# Patient Record
Sex: MALE | Race: BLACK OR AFRICAN AMERICAN | NOT HISPANIC OR LATINO
[De-identification: names, ages, dates, MRNs, and addresses within clinical notes are randomized per-mention and may not be internally consistent; named-entity substitution may affect disease eponyms.]

---

## 2021-09-09 PROBLEM — Z00.00 ENCOUNTER FOR PREVENTIVE HEALTH EXAMINATION: Status: ACTIVE | Noted: 2021-09-09

## 2021-09-17 ENCOUNTER — APPOINTMENT (OUTPATIENT)
Dept: DERMATOLOGY | Facility: CLINIC | Age: 31
End: 2021-09-17

## 2021-09-24 ENCOUNTER — APPOINTMENT (OUTPATIENT)
Dept: DERMATOLOGY | Facility: CLINIC | Age: 31
End: 2021-09-24
Payer: MEDICAID

## 2021-09-24 PROCEDURE — 99204 OFFICE O/P NEW MOD 45 MIN: CPT

## 2021-09-24 NOTE — PHYSICAL EXAM
[FreeTextEntry3] : cystic nodule gluteal, axillae, under pannus\par \par red patches/plaques face, beard area

## 2021-09-24 NOTE — HISTORY OF PRESENT ILLNESS
[FreeTextEntry1] : cysts, psoriasis [de-identified] : cysts pilonidal cyst\par draining\par \par ?psor

## 2021-09-24 NOTE — ASSESSMENT
[FreeTextEntry1] : HS\par rx doxy 1 mo\par \par Psor\par rx protopic (face)\par \par Pilonidal cyst - refer to surg

## 2021-10-26 ENCOUNTER — NON-APPOINTMENT (OUTPATIENT)
Age: 31
End: 2021-10-26

## 2021-10-29 ENCOUNTER — NON-APPOINTMENT (OUTPATIENT)
Age: 31
End: 2021-10-29

## 2021-11-03 ENCOUNTER — APPOINTMENT (OUTPATIENT)
Dept: UROLOGY | Facility: CLINIC | Age: 31
End: 2021-11-03
Payer: MEDICAID

## 2021-11-03 VITALS — DIASTOLIC BLOOD PRESSURE: 87 MMHG | TEMPERATURE: 94.7 F | HEART RATE: 111 BPM | SYSTOLIC BLOOD PRESSURE: 164 MMHG

## 2021-11-03 DIAGNOSIS — Z86.69 PERSONAL HISTORY OF OTHER DISEASES OF THE NERVOUS SYSTEM AND SENSE ORGANS: ICD-10-CM

## 2021-11-03 DIAGNOSIS — I10 ESSENTIAL (PRIMARY) HYPERTENSION: ICD-10-CM

## 2021-11-03 DIAGNOSIS — Z87.2 PERSONAL HISTORY OF DISEASES OF THE SKIN AND SUBCUTANEOUS TISSUE: ICD-10-CM

## 2021-11-03 PROCEDURE — 99203 OFFICE O/P NEW LOW 30 MIN: CPT

## 2021-11-03 RX ORDER — CIPROFLOXACIN HYDROCHLORIDE 500 MG/1
500 TABLET, FILM COATED ORAL
Qty: 14 | Refills: 0 | Status: DISCONTINUED | COMMUNITY
Start: 2021-11-03 | End: 2021-11-03

## 2021-11-04 ENCOUNTER — APPOINTMENT (OUTPATIENT)
Dept: DERMATOLOGY | Facility: CLINIC | Age: 31
End: 2021-11-04
Payer: MEDICAID

## 2021-11-04 DIAGNOSIS — L72.11 PILAR CYST: ICD-10-CM

## 2021-11-04 PROCEDURE — 99214 OFFICE O/P EST MOD 30 MIN: CPT

## 2021-11-04 RX ORDER — TACROLIMUS 1 MG/G
0.1 OINTMENT TOPICAL TWICE DAILY
Qty: 1 | Refills: 1 | Status: DISCONTINUED | COMMUNITY
Start: 2021-09-24 | End: 2021-11-04

## 2021-11-04 NOTE — ASSESSMENT
[FreeTextEntry1] : GIORGI DAVIS  is a 31 year old male morbidly obese who presents for chronic fatigue, progressive weakness, right sided testicular pain, possible testicular lump and painless hematuria. He has pain with palpation of right side testicle. Exam is normal\par \par PLAN\par -UA & UCx & urine cytology\par -US Testicles\par -US Kidney/Bladder \par -Follow up in 3 weeks \par -Already taking doxycycline from derm. No new abx at this time. Follow up UA/UCx

## 2021-11-04 NOTE — PHYSICAL EXAM
[General Appearance - Well Developed] : well developed [General Appearance - Well Nourished] : well nourished [Normal Appearance] : normal appearance [Well Groomed] : well groomed [General Appearance - In No Acute Distress] : no acute distress [Edema] : no peripheral edema [] : no respiratory distress [Respiration, Rhythm And Depth] : normal respiratory rhythm and effort [Exaggerated Use Of Accessory Muscles For Inspiration] : no accessory muscle use [Normal Station and Gait] : the gait and station were normal for the patient's age [FreeTextEntry1] : pain with palpation, normal testes and epididymis bilaterally

## 2021-11-04 NOTE — END OF VISIT
[FreeTextEntry3] : 30yo morbidly obese male with chronic fatigue for over a year, c/o new right testicular pain and hematuria. Suspect possible UTI, epididymitis. Recommend UA, culture, cytology. He is taking doxycycline. Check scrotal US and renal bladder US. F/u 3 weeks

## 2021-11-04 NOTE — HISTORY OF PRESENT ILLNESS
[5] : 5 [FreeTextEntry1] : GIORGI DAVIS  is a 31 year old male who presents for fatigue, progressive weakness, right sided testicular pain and painless hematuria.  \par \par He reports dull right sided intermittent testicular pain that first started 6 months ago and became severe on 10/26 after sitting down.  He reports his testicle is swollen and there is a lump that "appeared out of nowhere". 2 days later he reports painless hematuria x3 that resolved spontaneously.  Reports significant fatigue with any activity or walking over the past year. Reports an 80 lb weight loss and regained within the past year.

## 2021-11-08 ENCOUNTER — NON-APPOINTMENT (OUTPATIENT)
Age: 31
End: 2021-11-08

## 2021-11-08 LAB
APPEARANCE: ABNORMAL
BACTERIA UR CULT: NORMAL
BACTERIA: NEGATIVE
BILIRUBIN URINE: NEGATIVE
BLOOD URINE: NEGATIVE
COLOR: YELLOW
GLUCOSE QUALITATIVE U: NEGATIVE
HYALINE CASTS: 2 /LPF
KETONES URINE: NEGATIVE
LEUKOCYTE ESTERASE URINE: NEGATIVE
MICROSCOPIC-UA: NORMAL
NITRITE URINE: NEGATIVE
PH URINE: 5.5
PROTEIN URINE: NORMAL
RED BLOOD CELLS URINE: 1 /HPF
SPECIFIC GRAVITY URINE: 1.02
SQUAMOUS EPITHELIAL CELLS: 2 /HPF
URINE CYTOLOGY: NORMAL
UROBILINOGEN URINE: NORMAL
WHITE BLOOD CELLS URINE: 2 /HPF

## 2021-11-15 ENCOUNTER — OUTPATIENT (OUTPATIENT)
Dept: OUTPATIENT SERVICES | Facility: HOSPITAL | Age: 31
LOS: 1 days | End: 2021-11-15

## 2021-11-15 ENCOUNTER — APPOINTMENT (OUTPATIENT)
Dept: ULTRASOUND IMAGING | Facility: CLINIC | Age: 31
End: 2021-11-15
Payer: MEDICAID

## 2021-11-15 PROCEDURE — 76770 US EXAM ABDO BACK WALL COMP: CPT | Mod: 26,59

## 2021-11-15 PROCEDURE — 93975 VASCULAR STUDY: CPT | Mod: 26

## 2021-11-15 PROCEDURE — 76870 US EXAM SCROTUM: CPT | Mod: 26

## 2021-11-17 ENCOUNTER — APPOINTMENT (OUTPATIENT)
Dept: PULMONOLOGY | Facility: CLINIC | Age: 31
End: 2021-11-17
Payer: MEDICAID

## 2021-11-17 ENCOUNTER — TRANSCRIPTION ENCOUNTER (OUTPATIENT)
Age: 31
End: 2021-11-17

## 2021-11-17 VITALS
BODY MASS INDEX: 41.75 KG/M2 | OXYGEN SATURATION: 98 % | HEART RATE: 88 BPM | SYSTOLIC BLOOD PRESSURE: 136 MMHG | DIASTOLIC BLOOD PRESSURE: 83 MMHG | WEIGHT: 315 LBS | TEMPERATURE: 97 F | HEIGHT: 73 IN | RESPIRATION RATE: 15 BRPM

## 2021-11-17 DIAGNOSIS — R06.83 SNORING: ICD-10-CM

## 2021-11-17 PROCEDURE — 99204 OFFICE O/P NEW MOD 45 MIN: CPT
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
## 2021-11-17 NOTE — CONSULT LETTER
[Dear  ___] : Dear  [unfilled], [Consult Letter:] : I had the pleasure of evaluating your patient, [unfilled]. [Please see my note below.] : Please see my note below. [Consult Closing:] : Thank you very much for allowing me to participate in the care of this patient.  If you have any questions, please do not hesitate to contact me. [Sincerely,] : Sincerely, [FreeTextEntry3] : maggie Esqueda MD

## 2021-11-17 NOTE — PHYSICAL EXAM
[General Appearance - In No Acute Distress] : no acute distress [Normal Conjunctiva] : the conjunctiva exhibited no abnormalities [Low Lying Soft Palate] : low lying soft palate [Neck Appearance] : the appearance of the neck was normal [Heart Rate And Rhythm] : heart rate was normal and rhythm regular [Heart Sounds] : normal S1 and S2 [Respiration, Rhythm And Depth] : normal respiratory rhythm and effort [Auscultation Breath Sounds / Voice Sounds] : lungs were clear to auscultation bilaterally [Involuntary Movements] : no involuntary movements were seen [Nail Clubbing] : no clubbing of the fingernails [Non-Pitting] : non-pitting [Skin Color & Pigmentation] : normal skin color and pigmentation [No Focal Deficits] : no focal deficits [Oriented To Time, Place, And Person] : oriented to person, place, and time [IV] : IV

## 2021-11-17 NOTE — REVIEW OF SYSTEMS
[EDS: ESS=____] : daytime somnolence: ESS=[unfilled] [Fatigue] : fatigue [Snoring] : snoring [Witnessed Apneas] : witnessed apnea [A.M. Dry Mouth] : a.m. dry mouth [Obesity] : obesity [A.M. Headache] : headache present upon awakening [Arthralgias] : arthralgias [Difficulty Maintaining Sleep] : difficulty maintaining sleep [Negative] : Cardiovascular [Difficulty Initiating Sleep] : no difficulty falling asleep [Lower Extremity Discomfort] : no lower extremity discomfort [Unusual Sleep Behavior] : no unusual sleep behavior [Cataplexy] :  no cataplexy

## 2021-11-29 ENCOUNTER — APPOINTMENT (OUTPATIENT)
Dept: UROLOGY | Facility: CLINIC | Age: 31
End: 2021-11-29
Payer: MEDICAID

## 2021-11-29 VITALS — HEART RATE: 97 BPM | TEMPERATURE: 97.2 F | DIASTOLIC BLOOD PRESSURE: 89 MMHG | SYSTOLIC BLOOD PRESSURE: 134 MMHG

## 2021-11-29 PROCEDURE — 99213 OFFICE O/P EST LOW 20 MIN: CPT

## 2021-11-29 NOTE — HISTORY OF PRESENT ILLNESS
[FreeTextEntry1] : GIORGI DAVIS  is a 31 year old male who presents for fatigue, progressive weakness, right sided testicular pain and painless hematuria.  \par \par He reports dull right sided intermittent testicular pain that first started 6 months ago and became severe on 10/26 after sitting down.  He reports his testicle is swollen and there is a lump that "appeared out of nowhere". 2 days later he reports painless hematuria x3 that resolved spontaneously.  Reports significant fatigue with any activity or walking over the past year. Reports an 80 lb weight loss and regained within the past year. \par \par 11/29/21 Here for f/u. Scrotal US shows 1cm epididymal cyst, renal US normal. His pain has now resolved. UA negative, cytology negative. He says he has hematuria today but voided urine is clear yellow.  [None] : None

## 2021-11-29 NOTE — PHYSICAL EXAM
[General Appearance - Well Developed] : well developed [General Appearance - Well Nourished] : well nourished [Normal Appearance] : normal appearance [Well Groomed] : well groomed [General Appearance - In No Acute Distress] : no acute distress [FreeTextEntry1] : morbidly obese [Edema] : no peripheral edema [] : no respiratory distress [Respiration, Rhythm And Depth] : normal respiratory rhythm and effort [Exaggerated Use Of Accessory Muscles For Inspiration] : no accessory muscle use [Normal Station and Gait] : the gait and station were normal for the patient's age

## 2021-11-29 NOTE — ASSESSMENT
[FreeTextEntry1] : GIORGI DAVIS  is a 31 year old male morbidly obese who presents for chronic fatigue, progressive weakness, right sided testicular pain, possible testicular lump and painless hematuria. He has pain with palpation of right side testicle. Exam is normal\par \par Renal ultrasound and scrotal ultrasound reviewed, 1cm right epididymal cyst\par Reassurance given\par His symptoms have essentially resolved except he still complains of intermittent hematuria. He says he has hematuria today but voided urine is clear yellow. Will repeat urine studies today. If microhematuria, will obtain CT urogram and cysto. Otherwise, patient was reassured \par RTC 3 months

## 2021-11-30 LAB
APPEARANCE: CLEAR
BACTERIA: NEGATIVE
BILIRUBIN URINE: NEGATIVE
BLOOD URINE: NEGATIVE
COLOR: YELLOW
GLUCOSE QUALITATIVE U: NEGATIVE
HYALINE CASTS: 7 /LPF
KETONES URINE: NEGATIVE
LEUKOCYTE ESTERASE URINE: NEGATIVE
MICROSCOPIC-UA: NORMAL
NITRITE URINE: NEGATIVE
PH URINE: 6
PROTEIN URINE: NORMAL
RED BLOOD CELLS URINE: 3 /HPF
SPECIFIC GRAVITY URINE: 1.02
SQUAMOUS EPITHELIAL CELLS: 2 /HPF
UROBILINOGEN URINE: NORMAL
WHITE BLOOD CELLS URINE: 4 /HPF

## 2021-12-01 ENCOUNTER — TRANSCRIPTION ENCOUNTER (OUTPATIENT)
Age: 31
End: 2021-12-01

## 2021-12-01 LAB — BACTERIA UR CULT: NORMAL

## 2021-12-16 ENCOUNTER — APPOINTMENT (OUTPATIENT)
Dept: DERMATOLOGY | Facility: CLINIC | Age: 31
End: 2021-12-16
Payer: MEDICAID

## 2021-12-16 PROCEDURE — 99214 OFFICE O/P EST MOD 30 MIN: CPT

## 2021-12-16 RX ORDER — CHLORHEXIDINE GLUCONATE 213 G/1000ML
4 SOLUTION TOPICAL
Qty: 1 | Refills: 3 | Status: ACTIVE | COMMUNITY
Start: 2021-11-04 | End: 1900-01-01

## 2022-01-14 ENCOUNTER — APPOINTMENT (OUTPATIENT)
Dept: NEUROLOGY | Facility: CLINIC | Age: 32
End: 2022-01-14
Payer: MEDICAID

## 2022-01-14 DIAGNOSIS — G89.29 HEADACHE, UNSPECIFIED: ICD-10-CM

## 2022-01-14 DIAGNOSIS — G43.909 MIGRAINE, UNSPECIFIED, NOT INTRACTABLE, W/OUT STATUS MIGRAINOSUS: ICD-10-CM

## 2022-01-14 DIAGNOSIS — R51.9 HEADACHE, UNSPECIFIED: ICD-10-CM

## 2022-01-14 PROCEDURE — 99214 OFFICE O/P EST MOD 30 MIN: CPT | Mod: 95

## 2022-01-14 NOTE — ASSESSMENT
[FreeTextEntry1] : Retry topamax 25mg titrated to 100mg/d\par nurtec prn fr abortive.\par Brain MRI in [facility]\par CPAP compliance once received as been stressed\par Keeping a diary has been discussed, including Apps- Migraine lois. \par f/u after testing/1 mth\par \par \par  \par

## 2022-01-14 NOTE — HISTORY OF PRESENT ILLNESS
[Home] : at home, [unfilled] , at the time of the visit. [Mother] : mother [Verbal consent obtained from patient] : the patient, [unfilled] [FreeTextEntry1] : 32yo M with history of severe obesity, RLS, ANDREW, HS, HTN, HLD, chronic migraines\par \par Patent presents today for evaluation of headaches. he reports he has been dealing with migraines since age 10.  in his 20s, the intensity decreased.  they occurred daily and often missed school. saw Neurologist at [facility] in the [place] did testing, but not sure what was found, completed as a teenager.  tried Imitrex, but was not effective and caused side effects.  tried Topamax had no side effect. has been taking OTC meds. \par \par  \par Location: frontal left frontal right,  \par described as pressure/ dull\par Timing of headache: varies, can occur in the middle of night\par Associated symptoms: photophobia, phonophobia, blurred vision, nausea, vomiting dizziness, anosmia, \par Pertinent negatives: visual aura, scotoma, memory impairment, neck stiffness,  \par Aggravated Factors: none\par Relived by: dark room, rest,  Excedrin\par Severity:  6-10\par Occurs: daily\par Duration: .4hrs\par  \par FH:  mother, grandmother \par Sleeps: fragmented, has sleep apnea, no machine ,pending CPAP\par Hydration: water: 2L-gallon  caffeine:none\par Triggers: certain scents, high pitched \par  \par \par Doesnt work, Currently studying at home. spends a lot of time laying down \par \par

## 2022-01-24 ENCOUNTER — APPOINTMENT (OUTPATIENT)
Dept: SLEEP CENTER | Facility: CLINIC | Age: 32
End: 2022-01-24

## 2022-01-27 ENCOUNTER — RESULT REVIEW (OUTPATIENT)
Age: 32
End: 2022-01-27

## 2022-01-27 ENCOUNTER — OUTPATIENT (OUTPATIENT)
Dept: OUTPATIENT SERVICES | Facility: HOSPITAL | Age: 32
LOS: 1 days | End: 2022-01-27

## 2022-01-27 ENCOUNTER — APPOINTMENT (OUTPATIENT)
Dept: MRI IMAGING | Facility: CLINIC | Age: 32
End: 2022-01-27
Payer: MEDICAID

## 2022-01-27 PROCEDURE — 70551 MRI BRAIN STEM W/O DYE: CPT | Mod: 26

## 2022-02-03 ENCOUNTER — NON-APPOINTMENT (OUTPATIENT)
Age: 32
End: 2022-02-03

## 2022-02-09 ENCOUNTER — APPOINTMENT (OUTPATIENT)
Dept: SLEEP CENTER | Facility: HOSPITAL | Age: 32
End: 2022-02-09

## 2022-02-15 ENCOUNTER — APPOINTMENT (OUTPATIENT)
Dept: DERMATOLOGY | Facility: CLINIC | Age: 32
End: 2022-02-15
Payer: MEDICAID

## 2022-02-15 DIAGNOSIS — L40.0 PSORIASIS VULGARIS: ICD-10-CM

## 2022-02-15 PROCEDURE — 99214 OFFICE O/P EST MOD 30 MIN: CPT | Mod: 25

## 2022-02-15 PROCEDURE — 11900 INJECT SKIN LESIONS </W 7: CPT

## 2022-02-15 RX ORDER — TACROLIMUS 1 MG/G
0.1 OINTMENT TOPICAL
Qty: 1 | Refills: 1 | Status: ACTIVE | COMMUNITY
Start: 2022-02-15 | End: 1900-01-01

## 2022-02-15 RX ORDER — DOXYCYCLINE HYCLATE 100 MG/1
100 CAPSULE ORAL TWICE DAILY
Qty: 60 | Refills: 0 | Status: DISCONTINUED | COMMUNITY
Start: 2021-09-24 | End: 2022-02-15

## 2022-02-15 RX ORDER — FLUOCINONIDE 0.5 MG/ML
0.05 SOLUTION TOPICAL
Qty: 1 | Refills: 2 | Status: DISCONTINUED | COMMUNITY
Start: 2021-12-16 | End: 2022-02-15

## 2022-02-16 ENCOUNTER — APPOINTMENT (OUTPATIENT)
Dept: UROLOGY | Facility: CLINIC | Age: 32
End: 2022-02-16

## 2022-02-22 ENCOUNTER — APPOINTMENT (OUTPATIENT)
Dept: SLEEP CENTER | Facility: CLINIC | Age: 32
End: 2022-02-22

## 2022-02-23 ENCOUNTER — APPOINTMENT (OUTPATIENT)
Dept: UROLOGY | Facility: CLINIC | Age: 32
End: 2022-02-23

## 2022-02-28 ENCOUNTER — APPOINTMENT (OUTPATIENT)
Dept: UROLOGY | Facility: CLINIC | Age: 32
End: 2022-02-28
Payer: MEDICAID

## 2022-02-28 VITALS — SYSTOLIC BLOOD PRESSURE: 152 MMHG | TEMPERATURE: 97.6 F | DIASTOLIC BLOOD PRESSURE: 92 MMHG | HEART RATE: 101 BPM

## 2022-02-28 DIAGNOSIS — G47.33 OBSTRUCTIVE SLEEP APNEA (ADULT) (PEDIATRIC): ICD-10-CM

## 2022-02-28 PROCEDURE — 99213 OFFICE O/P EST LOW 20 MIN: CPT

## 2022-02-28 NOTE — ASSESSMENT
[FreeTextEntry1] : 31 year old male morbidly obese who presents for chronic fatigue, progressive weakness, right sided testicular pain, possible testicular lump and painless hematuria. He has pain with palpation of right side testicle. Exam is normal\par \par Prior ultrasound and scrotal ultrasound reviewed, 1cm right epididymal cyst\par \par Repeat scrotal US for worsening pain\par Check CT abd/pelvis urogram for reported history of gross hematuria \par Recommend ibuprofen and tylenol prn \par F/u 3 weeks\par

## 2022-02-28 NOTE — PHYSICAL EXAM
[General Appearance - Well Developed] : well developed [General Appearance - Well Nourished] : well nourished [Normal Appearance] : normal appearance [Well Groomed] : well groomed [General Appearance - In No Acute Distress] : no acute distress [Edema] : no peripheral edema [] : no respiratory distress [Respiration, Rhythm And Depth] : normal respiratory rhythm and effort [Exaggerated Use Of Accessory Muscles For Inspiration] : no accessory muscle use [Normal Station and Gait] : the gait and station were normal for the patient's age [Scrotum] : the scrotum was normal [Testes Tenderness] : no tenderness of the testes [Testes Mass (___cm)] : there were no testicular masses [FreeTextEntry1] : prominent right epididymitis but no masses

## 2022-02-28 NOTE — HISTORY OF PRESENT ILLNESS
[FreeTextEntry1] : GIORGI DAVIS  is a 31 year old male who presents for fatigue, progressive weakness, right sided testicular pain and painless hematuria.  \par \par He reports dull right sided intermittent testicular pain that first started 6 months ago and became severe on 10/26 after sitting down.  He reports his testicle is swollen and there is a lump that "appeared out of nowhere". 2 days later he reports painless hematuria x3 that resolved spontaneously.  Reports significant fatigue with any activity or walking over the past year. Reports an 80 lb weight loss and regained within the past year. \par \par 11/29/21 Here for f/u. Scrotal US shows 1cm epididymal cyst, renal US normal. His pain has now resolved. UA negative, cytology negative. He says he has hematuria today but voided urine is clear yellow. \par \par 2/28/22 Here for f/u. Says right testicular pain returned after last visit, now is more debilitating. Says pain is so bad, he sometimes loses balance and falls. Also says he saw blood in the urine following his last visit. Previous renal bladder US was normal. Has been taking ibuprofen and tylenol for pain.  [8] : 8 [None] : There is no radiation [Sharp] : sharp [Constant] : constantly [Moderate] : moderate in severity [de-identified] : right scrotum

## 2022-03-01 LAB
APPEARANCE: CLEAR
BACTERIA: NEGATIVE
BILIRUBIN URINE: NEGATIVE
BLOOD URINE: NEGATIVE
COLOR: YELLOW
GLUCOSE QUALITATIVE U: NEGATIVE
HYALINE CASTS: 0 /LPF
KETONES URINE: NEGATIVE
LEUKOCYTE ESTERASE URINE: NEGATIVE
MICROSCOPIC-UA: NORMAL
NITRITE URINE: NEGATIVE
PH URINE: 6
PROTEIN URINE: NORMAL
RED BLOOD CELLS URINE: 2 /HPF
SPECIFIC GRAVITY URINE: 1.04
SQUAMOUS EPITHELIAL CELLS: 0 /HPF
UROBILINOGEN URINE: NORMAL
WHITE BLOOD CELLS URINE: 1 /HPF

## 2022-03-02 LAB — BACTERIA UR CULT: NORMAL

## 2022-03-11 ENCOUNTER — APPOINTMENT (OUTPATIENT)
Dept: ULTRASOUND IMAGING | Facility: CLINIC | Age: 32
End: 2022-03-11
Payer: MEDICAID

## 2022-03-11 ENCOUNTER — RESULT REVIEW (OUTPATIENT)
Age: 32
End: 2022-03-11

## 2022-03-11 ENCOUNTER — OUTPATIENT (OUTPATIENT)
Dept: OUTPATIENT SERVICES | Facility: HOSPITAL | Age: 32
LOS: 1 days | End: 2022-03-11

## 2022-03-11 ENCOUNTER — APPOINTMENT (OUTPATIENT)
Dept: CT IMAGING | Facility: CLINIC | Age: 32
End: 2022-03-11
Payer: MEDICAID

## 2022-03-11 PROCEDURE — 93975 VASCULAR STUDY: CPT | Mod: 26

## 2022-03-11 PROCEDURE — 74178 CT ABD&PLV WO CNTR FLWD CNTR: CPT | Mod: 26

## 2022-03-11 PROCEDURE — 76870 US EXAM SCROTUM: CPT | Mod: 26

## 2022-03-17 ENCOUNTER — NON-APPOINTMENT (OUTPATIENT)
Age: 32
End: 2022-03-17

## 2022-03-17 ENCOUNTER — TRANSCRIPTION ENCOUNTER (OUTPATIENT)
Age: 32
End: 2022-03-17

## 2022-03-28 ENCOUNTER — APPOINTMENT (OUTPATIENT)
Dept: UROLOGY | Facility: CLINIC | Age: 32
End: 2022-03-28
Payer: MEDICAID

## 2022-03-28 VITALS
BODY MASS INDEX: 41.75 KG/M2 | WEIGHT: 315 LBS | SYSTOLIC BLOOD PRESSURE: 179 MMHG | HEART RATE: 105 BPM | HEIGHT: 73 IN | DIASTOLIC BLOOD PRESSURE: 109 MMHG | TEMPERATURE: 96.7 F

## 2022-03-28 DIAGNOSIS — N50.811 RIGHT TESTICULAR PAIN: ICD-10-CM

## 2022-03-28 PROCEDURE — 99214 OFFICE O/P EST MOD 30 MIN: CPT

## 2022-03-28 NOTE — HISTORY OF PRESENT ILLNESS
[8] : 8 [None] : There is no radiation [Sharp] : sharp [Constant] : constantly [Moderate] : moderate in severity [FreeTextEntry1] : GIORGI DAVIS  is a 31 year old male who presents for fatigue, progressive weakness, right sided testicular pain and painless hematuria.  \par \par He reports dull right sided intermittent testicular pain that first started 6 months ago and became severe on 10/26 after sitting down.  He reports his testicle is swollen and there is a lump that "appeared out of nowhere". 2 days later he reports painless hematuria x3 that resolved spontaneously.  Reports significant fatigue with any activity or walking over the past year. Reports an 80 lb weight loss and regained within the past year. \par \par 11/29/21 Here for f/u. Scrotal US shows 1cm epididymal cyst, renal US normal. His pain has now resolved. UA negative, cytology negative. He says he has hematuria today but voided urine is clear yellow. \par \par 2/28/22 Here for f/u. Says right testicular pain returned after last visit, now is more debilitating. Says pain is so bad, he sometimes loses balance and falls. Also says he saw blood in the urine following his last visit. Previous renal bladder US was normal. Has been taking ibuprofen and tylenol for pain. \par \par 3/29/22 Here for f/u. Testicular pain still 10/10, pulsating and debilitating. Taking maximum daily doses of ibuprofen, APAP with no improvement. Last episode of gross hematuria 1 week ago, lasted 3-4 days. \par Had normal CT urogram, negative urine cytology\par Normal scrotal US\par  [de-identified] : right scrotum

## 2022-03-28 NOTE — END OF VISIT
[FreeTextEntry3] : I, Dr. Durham, personally performed the evaluation and management (E/M) services for this established patient who presents today with (a) new problem(s)/exacerbation of (an) existing condition(s).  That E/M includes conducting the examination, assessing all new/exacerbated conditions, and establishing a new plan of care.  Today, our ACP, Lazara Omer, was here to observe the evaluation and management services for this new problem/exacerbated condition to be followed going forward.\par

## 2022-03-28 NOTE — ASSESSMENT
[FreeTextEntry1] : 31 year old male morbidly obese with chronic fatigue, progressive weakness, right sided testicular pain, possible testicular lump and painless hematuria. He has pain with palpation of right side testicle.\par Exam is normal. \par CT abd/pelvis urogram unremarkable. Imaging reviewed\par Ultrasound demonstrates right borderline varicocele, 1.1 cm epididymal cyst. \par Cont. ibuprofen and tylenol prn.\par repeat Ua/Ucx pending. \par F/u cystoscopy \par Referred to pelvic floor PT for chronic pelvic pain, normal exam and radiological studies \par

## 2022-03-28 NOTE — REVIEW OF SYSTEMS
[Feeling Poorly] : feeling poorly [see HPI] : see HPI [Negative] : Heme/Lymph [Fever] : no fever [Chills] : no chills

## 2022-03-29 LAB
APPEARANCE: CLEAR
BACTERIA: NEGATIVE
BILIRUBIN URINE: NEGATIVE
BLOOD URINE: NEGATIVE
COLOR: YELLOW
GLUCOSE QUALITATIVE U: NEGATIVE
HYALINE CASTS: 0 /LPF
KETONES URINE: NEGATIVE
LEUKOCYTE ESTERASE URINE: NEGATIVE
MICROSCOPIC-UA: NORMAL
NITRITE URINE: NEGATIVE
PH URINE: 6
PROTEIN URINE: NORMAL
RED BLOOD CELLS URINE: 3 /HPF
SPECIFIC GRAVITY URINE: 1.03
SQUAMOUS EPITHELIAL CELLS: 3 /HPF
UROBILINOGEN URINE: NORMAL
WHITE BLOOD CELLS URINE: 3 /HPF

## 2022-03-30 LAB — BACTERIA UR CULT: NORMAL

## 2022-04-06 ENCOUNTER — APPOINTMENT (OUTPATIENT)
Dept: UROLOGY | Facility: CLINIC | Age: 32
End: 2022-04-06
Payer: MEDICAID

## 2022-04-06 VITALS — SYSTOLIC BLOOD PRESSURE: 155 MMHG | HEART RATE: 118 BPM | TEMPERATURE: 97.6 F | DIASTOLIC BLOOD PRESSURE: 92 MMHG

## 2022-04-06 DIAGNOSIS — R31.0 GROSS HEMATURIA: ICD-10-CM

## 2022-04-06 PROCEDURE — 52000 CYSTOURETHROSCOPY: CPT

## 2022-04-20 ENCOUNTER — APPOINTMENT (OUTPATIENT)
Dept: DERMATOLOGY | Facility: CLINIC | Age: 32
End: 2022-04-20
Payer: MEDICAID

## 2022-04-20 VITALS — HEIGHT: 73 IN | BODY MASS INDEX: 41.75 KG/M2 | WEIGHT: 315 LBS

## 2022-04-20 PROCEDURE — 99214 OFFICE O/P EST MOD 30 MIN: CPT

## 2022-04-21 RX ORDER — RIMEGEPANT SULFATE 75 MG/75MG
75 TABLET, ORALLY DISINTEGRATING ORAL
Qty: 8 | Refills: 2 | Status: ACTIVE | COMMUNITY
Start: 2022-01-14 | End: 1900-01-01

## 2022-05-03 ENCOUNTER — APPOINTMENT (OUTPATIENT)
Dept: ORTHOPEDIC SURGERY | Facility: CLINIC | Age: 32
End: 2022-05-03
Payer: MEDICAID

## 2022-05-03 VITALS — WEIGHT: 315 LBS | HEIGHT: 73 IN | BODY MASS INDEX: 41.75 KG/M2

## 2022-05-03 PROCEDURE — 99204 OFFICE O/P NEW MOD 45 MIN: CPT

## 2022-05-03 PROCEDURE — 72070 X-RAY EXAM THORAC SPINE 2VWS: CPT

## 2022-05-03 PROCEDURE — 72050 X-RAY EXAM NECK SPINE 4/5VWS: CPT

## 2022-05-03 PROCEDURE — 72110 X-RAY EXAM L-2 SPINE 4/>VWS: CPT

## 2022-05-03 RX ORDER — MELOXICAM 15 MG/1
15 TABLET ORAL
Qty: 30 | Refills: 1 | Status: ACTIVE | COMMUNITY
Start: 2022-05-03 | End: 1900-01-01

## 2022-05-03 NOTE — ASSESSMENT
[FreeTextEntry1] : 32 year old male presents with acute exacerbation of chronic neck, upper back and low back pain.  He denies trauma.  He reports episodes of numbness in his bilateral lower extremities.  He says this happens mostly at night.  He feels weakness in his legs. He also reports urinary symptoms and numbness.  He has been to a urologist.  He reports involuntary movements of his neck which cause pain afterwards.  He is otherwise neurologically intact.  His radiographs show mild degenerative changes. He will be sent for a lumbar MRI to rule out chronic cauda equina.  He was given a referral for physical therapy. He will followup with his urologist. He was given a referral to see a neurologist.  He was given a prescription for meloxicam.  He will followup after his MRI. We discussed red flag symptoms that would require emergent evaluation. He knows to call with any questions or concerns or if his symptoms acutely worsen.

## 2022-05-03 NOTE — HISTORY OF PRESENT ILLNESS
[de-identified] : 32 year old male presents with acute exacerbation of chronic neck, upper back and low back pain.  He denies trauma.  He reports episodes of numbness in his bilateral lower extremities.  He says this happens mostly at night.  He feels weakness in his legs. He also reports urinary symptoms and numbness.  He has been to a urologist.  He takes Tylenol without relief.  He reports involuntary movements of his neck which cause pain afterwards.  He is accompanied by his mother.  He denies recent illness, fevers, balance problems, saddle anesthesia, urinary retention or fecal incontinence.

## 2022-05-03 NOTE — PHYSICAL EXAM
[de-identified] : General: No acute distress, conversant, well-nourished.\par Head: Normocephalic, atraumatic\par Neck: trachea midline, FROM\par Heart: normotensive and normal rate and rhythm\par Lungs: No labored breathing\par Skin: No abrasions, no rashes, no edema\par Psych: Alert and oriented to person, place and time\par Extremities: no peripheral edema or digital cyanosis\par Gait: Normal gait. Can perform tandem gait.  \par Vascular: warm and well perfused distally, palpable distal pulses\par \par MSK:\par Spine: \par No tenderness to palpation.  No step-off, no deformity.\par \par NEURO:\par Sensation \par          Left           \par C5     2/2               \par C6     2/2               \par C7     2/2               \par C8     2/2              \par T1     2/2             \par \par          Right         \par C5     2/2               \par C6     2/2               \par C7     2/2               \par C8     2/2              \par T1     2/2      \par \par Motor: \par                                                Left             \par C5 (deltoid abduction)             5/5               \par C6 (biceps flexion)                   5/5                \par C7 (triceps extension)             5/5               \par C8 (finger flexion)                     5/5               \par T1 (interosseous)                     5/5           \par \par                                                Right           \par C5 (deltoid abduction)             5/5               \par C6 (biceps flexion)                   5/5                \par C7 (triceps extension)             5/5               \par C8 (finger flexion)                     5/5               \par T1 (interosseous)                     5/5                     \par \par Sensation \par Left L2  -  2/2            \par Left L3  -  2/2\par Left L4  -  2/2\par Left L5  -  2/2\par Left S1  -  2/2\par \par Right L2  -  2/2            \par Right L3  -  2/2\par Right L4  -  2/2\par Right L5  -  2/2\par Right S1  -  2/2\par \par Motor: \par Left L2 (hip flexion)                            5/5                \par Left L3 (knee extension)                   5/5                \par Left L4 (ankle dorsiflexion)                 5/5                \par Left L5 (long toe extensor)                5/5                \par Left S1 (ankle plantar flexion)           5/5\par \par Right L2 (hip flexion)                            5/5                \par Right L3 (knee extension)                   5/5                \par Right L4 (ankle dorsiflexion)                 5/5                \par Right L5 (long toe extensor)                5/5                \par Right S1 (ankle plantar flexion)           5/5\par \par Reflexes: Normal and symmetric\par Negative Spurling’s test.  Negative Mancuso’s reflex.  \par Negative clonus.  Down-going Babinski [de-identified] : I ordered radiographs to evaluate the patient's symptoms.\par \par Cervical 4 view radiographs taken in the office today show no dislocation or fracture. Straightening of cervical spine.  No instability on dynamic series.\par \par Thoracic 2 view radiographs obtained in the office today shows no fracture or dislocation. \par \par Lumbar 4 view radiographs taken in the office today show no dislocation or fracture.  Decreased L5-S1 disc height.  No instability on dynamic series.

## 2022-05-27 ENCOUNTER — APPOINTMENT (OUTPATIENT)
Dept: MRI IMAGING | Facility: CLINIC | Age: 32
End: 2022-05-27
Payer: MEDICAID

## 2022-05-27 ENCOUNTER — RESULT REVIEW (OUTPATIENT)
Age: 32
End: 2022-05-27

## 2022-05-27 ENCOUNTER — OUTPATIENT (OUTPATIENT)
Dept: OUTPATIENT SERVICES | Facility: HOSPITAL | Age: 32
LOS: 1 days | End: 2022-05-27

## 2022-05-27 PROCEDURE — 72148 MRI LUMBAR SPINE W/O DYE: CPT | Mod: 26

## 2022-06-09 ENCOUNTER — APPOINTMENT (OUTPATIENT)
Dept: ORTHOPEDIC SURGERY | Facility: CLINIC | Age: 32
End: 2022-06-09

## 2022-06-17 ENCOUNTER — APPOINTMENT (OUTPATIENT)
Dept: ORTHOPEDIC SURGERY | Facility: CLINIC | Age: 32
End: 2022-06-17
Payer: MEDICAID

## 2022-06-17 DIAGNOSIS — M54.50 LOW BACK PAIN, UNSPECIFIED: ICD-10-CM

## 2022-06-17 DIAGNOSIS — M54.9 DORSALGIA, UNSPECIFIED: ICD-10-CM

## 2022-06-17 PROCEDURE — 99214 OFFICE O/P EST MOD 30 MIN: CPT

## 2022-06-17 RX ORDER — CYCLOBENZAPRINE HYDROCHLORIDE 5 MG/1
5 TABLET, FILM COATED ORAL 3 TIMES DAILY
Qty: 15 | Refills: 0 | Status: ACTIVE | COMMUNITY
Start: 2022-06-17 | End: 1900-01-01

## 2022-06-19 PROBLEM — M54.9 UPPER BACK PAIN: Status: ACTIVE | Noted: 2022-05-03

## 2022-06-19 PROBLEM — M54.50 LOW BACK PAIN: Status: ACTIVE | Noted: 2022-05-03

## 2022-06-19 NOTE — ASSESSMENT
[FreeTextEntry1] : 32 year old male followup with acute exacerbation of chronic neck, upper back and low back pain.  He denies trauma.  He reports episodes of numbness in his bilateral lower extremities.  He says this happens mostly at night.  He feels weakness in his legs. He also reports urinary symptoms and numbness.  He has been to a urologist.  He reports involuntary movements of his neck which cause pain afterwards. He is otherwise neurologically intact.  Since his last visit his symptoms have remained relatively unchanged. We reviewed his lumbar MRI which shows degenerative changes without compression of the neural elements. No indication for spine surgery at this time.  He was given a new referral to start PT.  He will see a neurologist.  He was given prescriptions for Meloxicam and cyclobenzaprine. He will followup after his neurology consultation.  We discussed red flag symptoms that would require emergent evaluation. He knows to call with any questions or concerns or if his symptoms acutely worsen.

## 2022-06-19 NOTE — REASON FOR VISIT
[Follow-Up Visit] : a follow-up visit for [Back Pain] : back pain [Neck Pain] : neck pain [FreeTextEntry2] : MRI review

## 2022-06-19 NOTE — PHYSICAL EXAM
[de-identified] : General: No acute distress, conversant, well-nourished.\par Head: Normocephalic, atraumatic\par Neck: trachea midline, FROM\par Heart: normotensive and normal rate and rhythm\par Lungs: No labored breathing\par Skin: No abrasions, no rashes, no edema\par Psych: Alert and oriented to person, place and time\par Extremities: no peripheral edema or digital cyanosis\par Gait: Normal gait. Can perform tandem gait.  \par Vascular: warm and well perfused distally, palpable distal pulses\par \par MSK:\par Spine: \par No tenderness to palpation.  No step-off, no deformity.\par \par NEURO:\par Sensation \par          Left           \par C5     2/2               \par C6     2/2               \par C7     2/2               \par C8     2/2              \par T1     2/2             \par \par          Right         \par C5     2/2               \par C6     2/2               \par C7     2/2               \par C8     2/2              \par T1     2/2      \par \par Motor: \par                                                Left             \par C5 (deltoid abduction)             5/5               \par C6 (biceps flexion)                   5/5                \par C7 (triceps extension)             5/5               \par C8 (finger flexion)                     5/5               \par T1 (interosseous)                     5/5           \par \par                                                Right           \par C5 (deltoid abduction)             5/5               \par C6 (biceps flexion)                   5/5                \par C7 (triceps extension)             5/5               \par C8 (finger flexion)                     5/5               \par T1 (interosseous)                     5/5                     \par \par Sensation \par Left L2  -  2/2            \par Left L3  -  2/2\par Left L4  -  2/2\par Left L5  -  2/2\par Left S1  -  2/2\par \par Right L2  -  2/2            \par Right L3  -  2/2\par Right L4  -  2/2\par Right L5  -  2/2\par Right S1  -  2/2\par \par Motor: \par Left L2 (hip flexion)                            5/5                \par Left L3 (knee extension)                   5/5                \par Left L4 (ankle dorsiflexion)                 5/5                \par Left L5 (long toe extensor)                5/5                \par Left S1 (ankle plantar flexion)           5/5\par \par Right L2 (hip flexion)                            5/5                \par Right L3 (knee extension)                   5/5                \par Right L4 (ankle dorsiflexion)                 5/5                \par Right L5 (long toe extensor)                5/5                \par Right S1 (ankle plantar flexion)           5/5\par \par Reflexes: Normal and symmetric\par Negative Spurling’s test.  Negative Mancuso’s reflex.  \par Negative clonus.  Down-going Babinski [de-identified] : MRI Lumber Spine No Cont: (05/27/22)\par Suboptimal exam due to body habitus.\par Mild degenerative changes at L5-S1 contributing to mild bilateral foraminal stenosis, involving exiting L5 nerve roots. Otherwise no significant canal or foraminal stenosis of the lumber spine. \par \par Cervical 4 view radiographs (5/3/22) no dislocation or fracture. Straightening of cervical spine.  No instability on dynamic series.\par \par Thoracic 2 view radiographs (5/3/22) no fracture or dislocation. \par \par Lumbar 4 view radiographs (5/3/22) no dislocation or fracture.  Decreased L5-S1 disc height.  No instability on dynamic series.

## 2022-06-19 NOTE — HISTORY OF PRESENT ILLNESS
[de-identified] : 32 year old male followup with acute exacerbation of chronic neck, upper back and low back pain.  He denies trauma.  He reports episodes of numbness in his bilateral lower extremities.  He says this happens mostly at night.  He feels weakness in his legs. He also reports urinary symptoms and numbness.  He has been to a urologist.  He reports involuntary movements of his neck which cause pain afterwards.  He is accompanied by his mother.  He denies recent illness, fevers, balance problems, saddle anesthesia, urinary retention or fecal incontinence.  Since his last visit his symptoms have remained relatively unchanged. He has not started PT.  He has an upcoming appointment with a neurologist. He is here to review his lumbar MRI.  He takes Meloxicam with some relief.

## 2022-07-21 ENCOUNTER — APPOINTMENT (OUTPATIENT)
Dept: DERMATOLOGY | Facility: CLINIC | Age: 32
End: 2022-07-21

## 2022-07-21 VITALS — BODY MASS INDEX: 48.16 KG/M2 | WEIGHT: 315 LBS

## 2022-07-21 PROCEDURE — 99214 OFFICE O/P EST MOD 30 MIN: CPT

## 2022-07-21 RX ORDER — HYDROCORTISONE 25 MG/G
2.5 OINTMENT TOPICAL TWICE DAILY
Qty: 1 | Refills: 2 | Status: ACTIVE | COMMUNITY
Start: 2021-11-04 | End: 1900-01-01

## 2022-07-21 RX ORDER — CLOBETASOL PROPIONATE 0.5 MG/ML
0.05 SOLUTION TOPICAL
Qty: 1 | Refills: 1 | Status: ACTIVE | COMMUNITY
Start: 2022-02-15 | End: 1900-01-01

## 2022-08-12 ENCOUNTER — APPOINTMENT (OUTPATIENT)
Dept: ORTHOPEDIC SURGERY | Facility: CLINIC | Age: 32
End: 2022-08-12

## 2022-08-19 ENCOUNTER — APPOINTMENT (OUTPATIENT)
Dept: ORTHOPEDIC SURGERY | Facility: CLINIC | Age: 32
End: 2022-08-19

## 2022-09-23 ENCOUNTER — RX RENEWAL (OUTPATIENT)
Age: 32
End: 2022-09-23

## 2022-09-23 RX ORDER — MELOXICAM 15 MG/1
15 TABLET ORAL
Qty: 30 | Refills: 1 | Status: ACTIVE | COMMUNITY
Start: 2022-06-17 | End: 1900-01-01

## 2022-10-07 ENCOUNTER — NON-APPOINTMENT (OUTPATIENT)
Age: 32
End: 2022-10-07

## 2022-10-07 ENCOUNTER — APPOINTMENT (OUTPATIENT)
Age: 32
End: 2022-10-07

## 2022-10-07 VITALS
HEART RATE: 72 BPM | TEMPERATURE: 97.7 F | HEIGHT: 73 IN | WEIGHT: 315 LBS | BODY MASS INDEX: 41.75 KG/M2 | DIASTOLIC BLOOD PRESSURE: 72 MMHG | OXYGEN SATURATION: 99 % | SYSTOLIC BLOOD PRESSURE: 126 MMHG

## 2022-10-07 DIAGNOSIS — R20.0 ANESTHESIA OF SKIN: ICD-10-CM

## 2022-10-07 DIAGNOSIS — R25.9 UNSPECIFIED ABNORMAL INVOLUNTARY MOVEMENTS: ICD-10-CM

## 2022-10-07 DIAGNOSIS — M54.2 CERVICALGIA: ICD-10-CM

## 2022-10-07 PROCEDURE — 99205 OFFICE O/P NEW HI 60 MIN: CPT

## 2022-10-07 RX ORDER — TIZANIDINE 4 MG/1
4 TABLET ORAL
Qty: 30 | Refills: 1 | Status: ACTIVE | COMMUNITY
Start: 2022-10-07 | End: 1900-01-01

## 2022-10-07 NOTE — DATA REVIEWED
[de-identified] : 1/27/2022 MRI brain normal except mastoid effusion [de-identified] : MRI Lumber Spine No Cont: (05/27/22)\par Suboptimal exam due to body habitus.\par Mild degenerative changes at L5-S1 contributing to mild bilateral foraminal stenosis, involving exiting L5 nerve roots. Otherwise no significant canal or foraminal stenosis of the lumber spine. \par \par Cervical 4 view radiographs (5/3/22) no dislocation or fracture. Straightening of cervical spine. No instability on dynamic series.\par \par Thoracic 2 view radiographs (5/3/22) no fracture or dislocation. \par \par Lumbar 4 view radiographs (5/3/22) no dislocation or fracture. Decreased L5-S1 disc height. No instability on dynamic series.

## 2022-10-07 NOTE — HISTORY OF PRESENT ILLNESS
[FreeTextEntry1] : GIORGI DAVIS is a 32 year who presents with abnormal movements.  Referred by Dr Art Kumari\par \par he was late \par \par he has neck spasms.  2006 was the first time he had bad spasms with head twitch to either side and hospitalized for this for 6-7 days. never had further care for this.  clear triggers are pain, stress.  it has increased recently and now leading to walking problems and back pain.  sometimes can have twitching in the arms or legs less often than the neck. no premonition of the movement but knows they will come if stressed. no sense of relief after he does the movement. he also feels numb and weak in the legs if the twitches go on for a long time.\par \par mother has seen him have muscle twitches in sleep as well.  \par \par Mother reports he might have had a seizure around age 1 or 1.5 and given seizure meds (PHB) which were stopped when no further seizures were seen. sometimes at night when he was very young he would have intense vomiting overnight and was hospitalized several times without any explanation. that stopped around age 4-5.  Mother doesn't recall staring spells.  He had speech delay, started speaking around age 1.  neither parent had speech delay.  No physically delay.  normal rest of childhood development.  \par \par completed 11th grade. at that time there was a major family tragedy with multiple deaths which seemed to be when this started.  given topiramate at that time and the cognitive issues significantly impacted school performance.\par \par he has intentionally lost 130lbs since starting care here.  intermittent fasting and kettle bell training.  he fels like pain worse since losing weight.\par \par has not done PT and does not see any interventional pain doctor\par \par he did see an NP specialist affiliated with [facility] neurology for headaches in Jan 2022 and he is concerned about how to stop the medication now.\par

## 2022-10-07 NOTE — PHYSICAL EXAM
[FreeTextEntry1] : General: this is a pleasant patient in no acute distress\par \par HEENT conjunctiva are normal, no tenderness in head\par \par CV: normal pulses, regular rate and rhythm, no peripheral edema noted\par \par Lungs: breathing is non-labored\par \par abd: soft and non-distended\par \par MSK: dense palpable chord in R mid paraspinal\par SLR: \par DRISS:\par range of motion:\par tinnels: \par spurling:\par Occipital nerve tenderness:\par \par Mental status:\par Alert and oriented to person, place and time, normal speech and comprehension\par \par Cranial Nerves:\par extra-occular movements in tact without nystagmus, normal saccades and smooth pursuit, Face symmetric and facial strength symmetric, facial sensation symmetric, \par \par Motor: normal bulk and tone throughout. no abnormal movements.  Full 5/5 strength uppers and lower extremities proximally and distally\par \par Sensory: in tact and symmetric to vibration, light tough, temperature\par \par Cerebellar: normal finger-nose-finger bilaterally\par \par Reflexes: 2+ in the upper and lower extremities and symmetric.  toes are bilaterally downgoing.\par \par Gait: stable, able to tip toe heel and tandem\par \par Stances:\par Romberg: normal\par \par

## 2022-10-07 NOTE — ASSESSMENT
[FreeTextEntry1] : in the past he was told he was "faking it'.  Today we didn't discuss the acutal trauma that preceded this as he was not comfortable, but I did explain in detail to him and his mother how trauma / PTSD spectrum problem can cause involuntary movements and also discussed that directed trauma therapy is usually a vital component of tretent

## 2022-10-20 ENCOUNTER — APPOINTMENT (OUTPATIENT)
Dept: DERMATOLOGY | Facility: CLINIC | Age: 32
End: 2022-10-20

## 2022-10-20 VITALS — WEIGHT: 315 LBS | HEIGHT: 73 IN | BODY MASS INDEX: 41.75 KG/M2

## 2022-10-20 PROCEDURE — 99214 OFFICE O/P EST MOD 30 MIN: CPT | Mod: 25

## 2022-10-20 PROCEDURE — 11900 INJECT SKIN LESIONS </W 7: CPT

## 2022-10-21 RX ORDER — TRETINOIN 0.25 MG/G
0.03 CREAM TOPICAL
Qty: 1 | Refills: 3 | Status: ACTIVE | COMMUNITY
Start: 2022-10-20

## 2022-10-27 ENCOUNTER — APPOINTMENT (OUTPATIENT)
Dept: NEUROLOGY | Facility: CLINIC | Age: 32
End: 2022-10-27

## 2022-10-27 PROCEDURE — 95819 EEG AWAKE AND ASLEEP: CPT

## 2022-10-28 PROCEDURE — 95708 EEG WO VID EA 12-26HR UNMNTR: CPT

## 2022-10-29 PROCEDURE — 95700 EEG CONT REC W/VID EEG TECH: CPT

## 2022-10-29 PROCEDURE — 95721 EEG PHY/QHP>36<60 HR W/O VID: CPT

## 2022-10-29 PROCEDURE — 95708 EEG WO VID EA 12-26HR UNMNTR: CPT

## 2022-10-31 ENCOUNTER — APPOINTMENT (OUTPATIENT)
Dept: NEUROLOGY | Facility: CLINIC | Age: 32
End: 2022-10-31

## 2022-12-08 ENCOUNTER — APPOINTMENT (OUTPATIENT)
Dept: DERMATOLOGY | Facility: CLINIC | Age: 32
End: 2022-12-08

## 2022-12-08 VITALS — WEIGHT: 307 LBS | BODY MASS INDEX: 40.5 KG/M2

## 2022-12-08 DIAGNOSIS — L72.0 EPIDERMAL CYST: ICD-10-CM

## 2022-12-08 PROCEDURE — 99214 OFFICE O/P EST MOD 30 MIN: CPT

## 2023-01-08 ENCOUNTER — RX RENEWAL (OUTPATIENT)
Age: 33
End: 2023-01-08

## 2023-01-12 ENCOUNTER — APPOINTMENT (OUTPATIENT)
Dept: SLEEP CENTER | Facility: HOSPITAL | Age: 33
End: 2023-01-12

## 2023-01-19 ENCOUNTER — APPOINTMENT (OUTPATIENT)
Dept: DERMATOLOGY | Facility: CLINIC | Age: 33
End: 2023-01-19

## 2023-02-06 ENCOUNTER — APPOINTMENT (OUTPATIENT)
Dept: DERMATOLOGY | Facility: CLINIC | Age: 33
End: 2023-02-06
Payer: MEDICAID

## 2023-02-06 VITALS — BODY MASS INDEX: 37.44 KG/M2 | WEIGHT: 283.8 LBS

## 2023-02-06 DIAGNOSIS — L30.9 DERMATITIS, UNSPECIFIED: ICD-10-CM

## 2023-02-06 PROCEDURE — 99214 OFFICE O/P EST MOD 30 MIN: CPT

## 2023-03-01 ENCOUNTER — APPOINTMENT (OUTPATIENT)
Age: 33
End: 2023-03-01

## 2023-05-08 ENCOUNTER — NON-APPOINTMENT (OUTPATIENT)
Age: 33
End: 2023-05-08

## 2023-05-10 ENCOUNTER — APPOINTMENT (OUTPATIENT)
Dept: UROLOGY | Facility: CLINIC | Age: 33
End: 2023-05-10
Payer: MEDICAID

## 2023-05-10 VITALS
DIASTOLIC BLOOD PRESSURE: 79 MMHG | HEIGHT: 73 IN | HEART RATE: 66 BPM | WEIGHT: 260 LBS | TEMPERATURE: 98.2 F | SYSTOLIC BLOOD PRESSURE: 143 MMHG | BODY MASS INDEX: 34.46 KG/M2

## 2023-05-10 DIAGNOSIS — N48.89 OTHER SPECIFIED DISORDERS OF PENIS: ICD-10-CM

## 2023-05-10 DIAGNOSIS — R10.2 PELVIC AND PERINEAL PAIN: ICD-10-CM

## 2023-05-10 DIAGNOSIS — E66.01 MORBID (SEVERE) OBESITY DUE TO EXCESS CALORIES: ICD-10-CM

## 2023-05-10 PROCEDURE — 99213 OFFICE O/P EST LOW 20 MIN: CPT

## 2023-05-11 ENCOUNTER — APPOINTMENT (OUTPATIENT)
Dept: DERMATOLOGY | Facility: CLINIC | Age: 33
End: 2023-05-11
Payer: MEDICAID

## 2023-05-11 VITALS — BODY MASS INDEX: 35.62 KG/M2 | WEIGHT: 270 LBS

## 2023-05-11 PROBLEM — N48.89 PENILE PAPULES: Status: ACTIVE | Noted: 2023-05-11

## 2023-05-11 PROCEDURE — 99214 OFFICE O/P EST MOD 30 MIN: CPT

## 2023-05-11 RX ORDER — TRETINOIN 0.5 MG/G
0.05 CREAM TOPICAL
Qty: 1 | Refills: 2 | Status: ACTIVE | COMMUNITY
Start: 2022-12-08 | End: 1900-01-01

## 2023-05-11 NOTE — PHYSICAL EXAM
[General Appearance - Well Developed] : well developed [General Appearance - Well Nourished] : well nourished [Normal Appearance] : normal appearance [Well Groomed] : well groomed [General Appearance - In No Acute Distress] : no acute distress [Edema] : no peripheral edema [] : no respiratory distress [Respiration, Rhythm And Depth] : normal respiratory rhythm and effort [Exaggerated Use Of Accessory Muscles For Inspiration] : no accessory muscle use [Oriented To Time, Place, And Person] : oriented to person, place, and time [Affect] : the affect was normal [Mood] : the mood was normal [Not Anxious] : not anxious [Normal Station and Gait] : the gait and station were normal for the patient's age [Urethral Meatus] : meatus normal [Testes Tenderness] : no tenderness of the testes [Testes Mass (___cm)] : there were no testicular masses [FreeTextEntry1] : 1 small pustule on penile shaft.  Appears to be healing. Additional very small pustule on L testicle.

## 2023-05-11 NOTE — HISTORY OF PRESENT ILLNESS
[FreeTextEntry1] : GIORGI DAVIS  is a 31 year old male who presents for fatigue, progressive weakness, right sided testicular pain and painless hematuria.  \par \par He reports dull right sided intermittent testicular pain that first started 6 months ago and became severe on 10/26 after sitting down.  He reports his testicle is swollen and there is a lump that "appeared out of nowhere". 2 days later he reports painless hematuria x3 that resolved spontaneously.  Reports significant fatigue with any activity or walking over the past year. Reports an 80 lb weight loss and regained within the past year. \par \par 11/29/21 Here for f/u. Scrotal US shows 1cm epididymal cyst, renal US normal. His pain has now resolved. UA negative, cytology negative. He says he has hematuria today but voided urine is clear yellow. \par \par 2/28/22 Here for f/u. Says right testicular pain returned after last visit, now is more debilitating. Says pain is so bad, he sometimes loses balance and falls. Also says he saw blood in the urine following his last visit. Previous renal bladder US was normal. Has been taking ibuprofen and tylenol for pain. \par \par 3/29/22 Here for f/u. Testicular pain still 10/10, pulsating and debilitating. Taking maximum daily doses of ibuprofen, APAP with no improvement. Last episode of gross hematuria 1 week ago, lasted 3-4 days. \par Had normal CT urogram, negative urine cytology\par Normal scrotal US\par \par 5/10/23 Here for f/u. Has lost >200 lbs with dieting.  He was not able to attain pelvic floor PT. Pelvic pain less now, but he is interested in PT. Complains of painless lumps on penis and testicle for the past two weeks.

## 2023-05-11 NOTE — ASSESSMENT
[FreeTextEntry1] : 33 year old male with hx of gross hematuria, chronic pelvic pain, testicular lumps \par Negative hematuria workup 4/2022\par \par Chronic pelvic pain \par Improving since significant intentional weight loss \par Cont. ibuprofen and tylenol prn.\par Referred again to pelvic floor PT \par \par Penile/Testicular lumps \par More consistent with folliculitis \par Resolving \par Patient reassured

## 2023-05-24 ENCOUNTER — RX RENEWAL (OUTPATIENT)
Age: 33
End: 2023-05-24

## 2023-08-24 ENCOUNTER — APPOINTMENT (OUTPATIENT)
Dept: DERMATOLOGY | Facility: CLINIC | Age: 33
End: 2023-08-24
Payer: MEDICAID

## 2023-08-24 VITALS — WEIGHT: 271 LBS | BODY MASS INDEX: 35.75 KG/M2

## 2023-08-24 PROCEDURE — 99214 OFFICE O/P EST MOD 30 MIN: CPT | Mod: 25

## 2023-08-24 PROCEDURE — 11900 INJECT SKIN LESIONS </W 7: CPT

## 2023-09-18 ENCOUNTER — RX RENEWAL (OUTPATIENT)
Age: 33
End: 2023-09-18

## 2023-09-29 ENCOUNTER — RX RENEWAL (OUTPATIENT)
Age: 33
End: 2023-09-29

## 2023-11-10 ENCOUNTER — APPOINTMENT (OUTPATIENT)
Dept: OPHTHALMOLOGY | Facility: CLINIC | Age: 33
End: 2023-11-10
Payer: MEDICAID

## 2023-11-10 ENCOUNTER — NON-APPOINTMENT (OUTPATIENT)
Age: 33
End: 2023-11-10

## 2023-11-10 ENCOUNTER — APPOINTMENT (OUTPATIENT)
Dept: OPHTHALMOLOGY | Facility: CLINIC | Age: 33
End: 2023-11-10

## 2023-11-10 PROCEDURE — 92004 COMPRE OPH EXAM NEW PT 1/>: CPT

## 2023-11-16 ENCOUNTER — APPOINTMENT (OUTPATIENT)
Dept: DERMATOLOGY | Facility: CLINIC | Age: 33
End: 2023-11-16
Payer: MEDICAID

## 2023-11-16 VITALS — BODY MASS INDEX: 32.06 KG/M2 | WEIGHT: 243 LBS

## 2023-11-16 PROCEDURE — 99214 OFFICE O/P EST MOD 30 MIN: CPT

## 2023-12-18 ENCOUNTER — APPOINTMENT (OUTPATIENT)
Dept: INTERNAL MEDICINE | Facility: CLINIC | Age: 33
End: 2023-12-18

## 2023-12-20 ENCOUNTER — RX RENEWAL (OUTPATIENT)
Age: 33
End: 2023-12-20

## 2023-12-21 RX ORDER — CLINDAMYCIN PHOSPHATE 10 MG/ML
1 LOTION TOPICAL
Qty: 180 | Refills: 5 | Status: ACTIVE | COMMUNITY
Start: 2021-11-04 | End: 1900-01-01

## 2024-01-08 ENCOUNTER — RX RENEWAL (OUTPATIENT)
Age: 34
End: 2024-01-08

## 2024-01-08 RX ORDER — TRETINOIN 1 MG/G
0.1 CREAM TOPICAL
Qty: 20 | Refills: 5 | Status: ACTIVE | COMMUNITY
Start: 2023-08-24 | End: 1900-01-01

## 2024-01-11 ENCOUNTER — APPOINTMENT (OUTPATIENT)
Dept: ALLERGY | Facility: CLINIC | Age: 34
End: 2024-01-11

## 2024-01-15 ENCOUNTER — RX RENEWAL (OUTPATIENT)
Age: 34
End: 2024-01-15

## 2024-05-16 ENCOUNTER — RX RENEWAL (OUTPATIENT)
Age: 34
End: 2024-05-16

## 2024-05-16 ENCOUNTER — APPOINTMENT (OUTPATIENT)
Dept: DERMATOLOGY | Facility: CLINIC | Age: 34
End: 2024-05-16
Payer: MEDICAID

## 2024-05-16 DIAGNOSIS — L73.2 HIDRADENITIS SUPPURATIVA: ICD-10-CM

## 2024-05-16 DIAGNOSIS — L21.9 SEBORRHEIC DERMATITIS, UNSPECIFIED: ICD-10-CM

## 2024-05-16 DIAGNOSIS — L70.9 ACNE, UNSPECIFIED: ICD-10-CM

## 2024-05-16 PROCEDURE — 99214 OFFICE O/P EST MOD 30 MIN: CPT

## 2024-05-16 RX ORDER — TRETINOIN 1 MG/G
0.1 CREAM TOPICAL
Qty: 1 | Refills: 5 | Status: ACTIVE | COMMUNITY
Start: 2024-05-16 | End: 1900-01-01

## 2024-05-16 RX ORDER — TOPIRAMATE 25 MG/1
25 TABLET, FILM COATED ORAL
Qty: 120 | Refills: 3 | Status: ACTIVE | COMMUNITY
Start: 2022-01-14 | End: 1900-01-01

## 2024-05-16 RX ORDER — KETOCONAZOLE 20.5 MG/ML
2 SHAMPOO, SUSPENSION TOPICAL
Qty: 120 | Refills: 11 | Status: ACTIVE | COMMUNITY
Start: 2021-11-04 | End: 1900-01-01

## 2024-05-16 RX ORDER — CLINDAMYCIN PHOSPHATE 1 G/10ML
1 GEL TOPICAL
Qty: 1 | Refills: 5 | Status: ACTIVE | COMMUNITY
Start: 2024-05-16 | End: 1900-01-01

## 2024-05-16 RX ORDER — CLINDAMYCIN PHOSPHATE 10 MG/ML
1 SOLUTION TOPICAL
Qty: 1 | Refills: 11 | Status: ACTIVE | COMMUNITY
Start: 2024-05-16 | End: 1900-01-01

## 2024-05-16 RX ORDER — KETOCONAZOLE 20 MG/G
2 CREAM TOPICAL TWICE DAILY
Qty: 60 | Refills: 3 | Status: ACTIVE | COMMUNITY
Start: 2021-12-16 | End: 1900-01-01

## 2024-05-16 RX ORDER — BENZOYL PEROXIDE 100 MG/ML
10 LIQUID TOPICAL
Qty: 1 | Refills: 5 | Status: ACTIVE | COMMUNITY
Start: 2022-04-20 | End: 1900-01-01

## 2024-05-16 RX ORDER — HYDROCORTISONE 25 MG/G
2.5 CREAM TOPICAL
Qty: 28.35 | Refills: 3 | Status: ACTIVE | COMMUNITY
Start: 2023-02-06 | End: 1900-01-01

## 2024-06-11 ENCOUNTER — APPOINTMENT (OUTPATIENT)
Dept: ALLERGY | Facility: CLINIC | Age: 34
End: 2024-06-11

## 2024-06-21 ENCOUNTER — APPOINTMENT (OUTPATIENT)
Dept: INTERNAL MEDICINE | Facility: CLINIC | Age: 34
End: 2024-06-21

## 2024-08-15 ENCOUNTER — APPOINTMENT (OUTPATIENT)
Dept: DERMATOLOGY | Facility: CLINIC | Age: 34
End: 2024-08-15

## 2024-11-26 ENCOUNTER — APPOINTMENT (OUTPATIENT)
Dept: OPHTHALMOLOGY | Facility: CLINIC | Age: 34
End: 2024-11-26

## 2025-01-23 ENCOUNTER — APPOINTMENT (OUTPATIENT)
Dept: DERMATOLOGY | Facility: CLINIC | Age: 35
End: 2025-01-23